# Patient Record
Sex: MALE | Race: WHITE | NOT HISPANIC OR LATINO | Employment: OTHER | URBAN - METROPOLITAN AREA
[De-identification: names, ages, dates, MRNs, and addresses within clinical notes are randomized per-mention and may not be internally consistent; named-entity substitution may affect disease eponyms.]

---

## 2022-05-05 ENCOUNTER — TRANSFERRED RECORDS (OUTPATIENT)
Dept: HEALTH INFORMATION MANAGEMENT | Facility: CLINIC | Age: 62
End: 2022-05-05
Payer: COMMERCIAL

## 2022-05-05 LAB
ALT SERPL-CCNC: 29 U/L (ref 12–78)
AST SERPL-CCNC: 18 U/L (ref 0–47)
CREATININE (EXTERNAL): 1.29 MG/DL (ref 0.7–1.3)
GFR ESTIMATED (EXTERNAL): >60 ML/MIN/1.73M2
GLUCOSE (EXTERNAL): 184 MG/DL (ref 70–99)
INR (EXTERNAL): 1.3 (ref 0.9–1.1)
POTASSIUM (EXTERNAL): 4 MMOL/L (ref 3.5–5.2)

## 2022-05-09 NOTE — TELEPHONE ENCOUNTER
Action 5.9.22 sv    Action Taken Called pt, no  but LVM about gathering records for future appointment      Action 5.12.22 sv    Action Taken Called pt and was able to locate most recent records [facility]- records request sent to-- [phone]     Action 5.13.22 sv    Action Taken Received records from [facility]- sent to Urgent fax, will keep fax in basket incase it doesn't load up in time (uploading in process)                 RECORDS RECEIVED FROM: internal    DATE RECEIVED: 5.16.22    NOTES STATUS DETAILS   OFFICE NOTE from referring provider     Self referred    OFFICE NOTE from other cardiologist    In process     DISCHARGE SUMMARY from hospital    In process     DISCHARGE REPORT from the ER   In process     OPERATIVE REPORT    In process     MEDICATION LIST   In process     LABS     BMP   In process     CBC   In process     CMP   In process     Lipids   In process     TSH   In process     DIAGNOSTIC PROCEDURES     EKG   In process     Monitor Reports   In process     IMAGING (DISC & REPORT)      Echo   In process     Stress Tests   In process     Cath   In process     MRI/MRA   In process     CT/CTA   In process

## 2022-05-16 ENCOUNTER — PRE VISIT (OUTPATIENT)
Dept: CARDIOLOGY | Facility: CLINIC | Age: 62
End: 2022-05-16
Payer: COMMERCIAL

## 2022-05-16 ENCOUNTER — OFFICE VISIT (OUTPATIENT)
Dept: CARDIOLOGY | Facility: CLINIC | Age: 62
End: 2022-05-16
Attending: INTERNAL MEDICINE
Payer: COMMERCIAL

## 2022-05-16 VITALS
HEART RATE: 79 BPM | OXYGEN SATURATION: 96 % | WEIGHT: 205.6 LBS | SYSTOLIC BLOOD PRESSURE: 117 MMHG | HEIGHT: 69 IN | BODY MASS INDEX: 30.45 KG/M2 | DIASTOLIC BLOOD PRESSURE: 79 MMHG

## 2022-05-16 DIAGNOSIS — I47.10 SVT (SUPRAVENTRICULAR TACHYCARDIA) (H): Primary | ICD-10-CM

## 2022-05-16 PROCEDURE — G0463 HOSPITAL OUTPT CLINIC VISIT: HCPCS | Mod: 25

## 2022-05-16 PROCEDURE — 93005 ELECTROCARDIOGRAM TRACING: CPT

## 2022-05-16 PROCEDURE — 99204 OFFICE O/P NEW MOD 45 MIN: CPT | Performed by: INTERNAL MEDICINE

## 2022-05-16 RX ORDER — DILTIAZEM HYDROCHLORIDE 180 MG/1
180 CAPSULE, EXTENDED RELEASE ORAL DAILY
Qty: 90 CAPSULE | Refills: 2 | Status: SHIPPED | OUTPATIENT
Start: 2022-05-16 | End: 2022-08-04

## 2022-05-16 RX ORDER — LISINOPRIL 20 MG/1
20 TABLET ORAL DAILY
COMMUNITY
Start: 2022-02-11 | End: 2022-05-16

## 2022-05-16 ASSESSMENT — PAIN SCALES - GENERAL: PAINLEVEL: NO PAIN (0)

## 2022-05-16 NOTE — NURSING NOTE
Chief Complaint   Patient presents with     New Patient     NEW- self referred, per wife SVT ?  no records as of 5/10/22       Vitals were taken, medications reconciled, and EKG was performed.    Jass Mata EMT  4:09 PM

## 2022-05-16 NOTE — LETTER
"5/16/2022      RE: Josue Franco  [address] [facility] [address]       Dear Colleague,    Thank you for the opportunity to participate in the care of your patient, Josue Franco, at the [facility] [place] at [facility]. Please see a copy of my visit note below.    HPI:   Josue Franco is a 61 yo Male with a PMH of HTN, PAF and SVT.  He referred himself for a cardiology evaluation.  His wife is an internist.  He had an AF episode in 2017 and underwent a DCCV.  He was likely to have had an SVT lasting for 30 min in 11/2021 at the time of hernia repair.  He then had an SVT episode on 5/5/2022 and visited the [facility] ED where Adenosine terminated the SVT.  He denied any chest pain, SOB or dizziness.    PAST MEDICAL HISTORY:  No past medical history on file.    CURRENT MEDICATIONS:  Current Outpatient Medications   Medication Sig Dispense Refill     lisinopril (ZESTRIL) 20 MG tablet Take 20 mg by mouth daily         PAST SURGICAL HISTORY:  No past surgical history on file.    ALLERGIES:   No Known Allergies    FAMILY HISTORY:  + Premature coronary artery disease  - Atrial fibrillation  - Sudden cardiac death     SOCIAL HISTORY:  Social History     Tobacco Use     Smoking status: Never Smoker     Smokeless tobacco: Never Used       ROS:   Constitutional: No fever, chills, or sweats. Weight stable.   ENT: No visual disturbance, ear ache, epistaxis, sore throat.   Cardiovascular: As per HPI.   Respiratory: No cough, hemoptysis.    GI: No nausea, vomiting, hematemesis, melena, or hematochezia.   : No hematuria.   Integument: Negative.   Psychiatric: Negative.   Hematologic:  Easy bruising, no easy bleeding.  Neuro: Negative.   Endocrinology: No significant heat or cold intolerance   Musculoskeletal: No myalgia.    Exam:  /79 (BP Location: Right arm, Patient Position: Chair, Cuff Size: Adult Large)   Pulse 79   Ht 1.744 m (5' 8.66\")   Wt " "93.3 kg (205 lb 9.6 oz)   SpO2 96%   BMI 30.66 kg/m    GENERAL APPEARANCE: healthy, alert and no distress  HEENT: no icterus, no xanthelasmas, normal pupil size and reaction, normal palate, mucosa moist, no central cyanosis  NECK: no adenopathy, no asymmetry, masses, or scars, thyroid normal to palpation and no bruits, JVP not elevated  RESPIRATORY: lungs clear to auscultation - no rales, rhonchi or wheezes, no use of accessory muscles, no retractions, respirations are unlabored, normal respiratory rate  CARDIOVASCULAR: regular rhythm, normal S1 with physiologic split S2, no S3 or S4 and no murmur, click or rub, precordium quiet with normal PMI.  ABDOMEN: soft, non tender, without hepatosplenomegaly, no masses palpable, bowel sounds normal, aorta not enlarged by palpation, no abdominal bruits  EXTREMITIES: peripheral pulses normal, no edema, no bruits  NEURO: alert and oriented to person/place/time, normal speech, gait and affect  VASC: Radial, femoral, dorsalis pedis and posterior tibialis pulses are normal in volumes and symmetric bilaterally. No bruits are heard.  SKIN: no ecchymoses, no rashes    Labs:  CBC RESULTS:   No results found for: WBC, RBC, HGB, HCT, MCV, MCH, MCHC, RDW, PLT    BMP RESULTS:  No results found for: NA, POTASSIUM, CHLORIDE, CO2, ANIONGAP, GLC, BUN, CR, GFRESTIMATED, GFRESTBLACK, ABELARDO     INR RESULTS:  No results found for: INR    Procedures:  ECG on 5/5/2022: Reviewed.          Assessment and Plan:   # HTN  # PAF  He had an AF episode in 2017 and underwent a DCCV.  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  He was likely to have had an SVT lasting for 30 min in 11/2021 at the time of hernia repair.  He then had an SVT episode on 5/5/2022 and visited the [facility]SH ED where Adenosine terminated the SVT.  We discussed treatment options including \"Pill in Pocket\" approach, switch Lisinopril to CCB or BB, and catheter ablation. Catheter ablation of SVT may also treat AF.  The nature, risks, benefits, alternatives and " anticipated results of the procedure were explained to the patient. These risks include but are not limited to local vascular damage, bleeding, pulmonary vein stenosis, AV block requiring a pacemaker implantation, tamponade and stroke. The patient wished to reflect on this and call us as he decides to proceed with the procedure.  He decided to switch Lisinopril to Diltiazem 180 mg daily and see how it works.  I will see him back in 6 months.    I spent a total of 45 min today to review the records, see the patient, and complete the documents.      Please do not hesitate to contact me if you have any questions/concerns.     Sincerely,     Shona Tai MD      CC  Patient Care Team:  Vaughn Borden MD as PCP - General

## 2022-05-16 NOTE — PATIENT INSTRUCTIONS
You were seen in the Electrophysiology Clinic today by: Dr Tai    Plan:   Medication Changes:   STOP- Lisinopril  START- Diltiazem 180mg ER once daily    Follow up visit:  6 months with Dr Tai          Your Care Team:  [facility] Cardiology   Telephone Number     Nurse Line  Ana Laura Jacobs RN  [phone]     For scheduling appts or procedures:    Jeanne Colon   [phone]   For the Device Clinic (Pacemakers, ICDs, Loop Recorders)    During business hours: [phone]  After business hours:   [phone]- select option 4 and ask for job code [id number].     On-call cardiologist for after hours or on weekends: [phone], option #4, and ask to speak to the on-call cardiologist.     Cardiovascular Clinic:   [address] [place] [address]. [place] [address]      As always, Thank you for trusting us with your health care needs!

## 2022-05-16 NOTE — PROGRESS NOTES
"HPI:   Josue Franco is a 63 yo Male with a PMH of HTN, PAF and SVT.  He referred himself for a cardiology evaluation.  His wife is an internist.  He had an AF episode in 2017 and underwent a DCCV.  He was likely to have had an SVT lasting for 30 min in 11/2021 at the time of hernia repair.  He then had an SVT episode on 5/5/2022 and [facility] the [facility] where Adenosine terminated the SVT.  He denied any chest pain, SOB or dizziness.    PAST MEDICAL HISTORY:  No past medical history on file.    CURRENT MEDICATIONS:  Current Outpatient Medications   Medication Sig Dispense Refill     lisinopril (ZESTRIL) 20 MG tablet Take 20 mg by mouth daily         PAST SURGICAL HISTORY:  No past surgical history on file.    ALLERGIES:   No Known Allergies    FAMILY HISTORY:  + Premature coronary artery disease  - Atrial fibrillation  - Sudden cardiac death     SOCIAL HISTORY:  Social History     Tobacco Use     Smoking status: Never Smoker     Smokeless tobacco: Never Used       ROS:   Constitutional: No fever, chills, or sweats. Weight stable.   ENT: No visual disturbance, ear ache, epistaxis, sore throat.   Cardiovascular: As per HPI.   Respiratory: No cough, hemoptysis.    GI: No nausea, vomiting, hematemesis, melena, or hematochezia.   : No hematuria.   Integument: Negative.   Psychiatric: Negative.   Hematologic:  Easy bruising, no easy bleeding.  Neuro: Negative.   Endocrinology: No significant heat or cold intolerance   Musculoskeletal: No myalgia.    Exam:  /79 (BP Location: Right arm, Patient Position: Chair, Cuff Size: Adult Large)   Pulse 79   Ht 1.744 m (5' 8.66\")   Wt 93.3 kg (205 lb 9.6 oz)   SpO2 96%   BMI 30.66 kg/m    GENERAL APPEARANCE: healthy, alert and no distress  HEENT: no icterus, no xanthelasmas, normal pupil size and reaction, normal palate, mucosa moist, no central cyanosis  NECK: no adenopathy, no asymmetry, masses, or scars, thyroid normal to palpation and no bruits, JVP not " "elevated  RESPIRATORY: lungs clear to auscultation - no rales, rhonchi or wheezes, no use of accessory muscles, no retractions, respirations are unlabored, normal respiratory rate  CARDIOVASCULAR: regular rhythm, normal S1 with physiologic split S2, no S3 or S4 and no murmur, click or rub, precordium quiet with normal PMI.  ABDOMEN: soft, non tender, without hepatosplenomegaly, no masses palpable, bowel sounds normal, aorta not enlarged by palpation, no abdominal bruits  EXTREMITIES: peripheral pulses normal, no edema, no bruits  NEURO: alert and oriented to person/place/time, normal speech, gait and affect  VASC: Radial, femoral, dorsalis pedis and posterior tibialis pulses are normal in volumes and symmetric bilaterally. No bruits are heard.  SKIN: no ecchymoses, no rashes    Labs:  CBC RESULTS:   No results found for: WBC, RBC, HGB, HCT, MCV, MCH, MCHC, RDW, PLT    BMP RESULTS:  No results found for: NA, POTASSIUM, CHLORIDE, CO2, ANIONGAP, GLC, BUN, CR, GFRESTIMATED, GFRESTBLACK, ABELARDO     INR RESULTS:  No results found for: INR    Procedures:  ECG on 5/5/2022: Reviewed.          Assessment and Plan:   # HTN  # PAF  He had an AF episode in 2017 and underwent a DCCV.  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  He was likely to have had an SVT lasting for 30 min in 11/2021 at the time of hernia repair.  He then had an SVT episode on 5/5/2022 and [facility] where Adenosine terminated the SVT.  We discussed treatment options including \"Pill in Pocket\" approach, switch Lisinopril to CCB or BB, and catheter ablation. Catheter ablation of SVT may also treat AF.  The nature, risks, benefits, alternatives and anticipated results of the procedure were explained to the patient. These risks include but are not limited to local vascular damage, bleeding, pulmonary vein stenosis, AV block requiring a pacemaker implantation, tamponade and stroke. The patient wished to reflect on this and call us as he decides to proceed with the " procedure.  He decided to switch Lisinopril to Diltiazem 180 mg daily and see how it works.  I will see him back in 6 months.    I spent a total of 45 min today to review the records, see the patient, and complete the documents.    CC  Patient Care Team:  Vaughn Borden MD as PCP - General  Shona Tai MD (Cardiovascular Disease)  SELF, REFERRED

## 2022-05-17 LAB
ATRIAL RATE - MUSE: 75 BPM
DIASTOLIC BLOOD PRESSURE - MUSE: NORMAL MMHG
INTERPRETATION ECG - MUSE: NORMAL
P AXIS - MUSE: 28 DEGREES
PR INTERVAL - MUSE: 142 MS
QRS DURATION - MUSE: 88 MS
QT - MUSE: 378 MS
QTC - MUSE: 422 MS
R AXIS - MUSE: 13 DEGREES
SYSTOLIC BLOOD PRESSURE - MUSE: NORMAL MMHG
T AXIS - MUSE: 52 DEGREES
VENTRICULAR RATE- MUSE: 75 BPM

## 2022-08-04 DIAGNOSIS — I47.10 SVT (SUPRAVENTRICULAR TACHYCARDIA) (H): ICD-10-CM

## 2022-08-04 RX ORDER — DILTIAZEM HYDROCHLORIDE 180 MG/1
180 CAPSULE, EXTENDED RELEASE ORAL DAILY
Qty: 30 CAPSULE | Refills: 0 | Status: SHIPPED | OUTPATIENT
Start: 2022-08-04 | End: 2022-11-21

## 2022-08-04 NOTE — TELEPHONE ENCOUNTER
[facility] Call Center    Phone Message    May a detailed message be left on voicemail: no     Reason for Call: Medication Question or concern regarding medication   Prescription Clarification  Name of Medication: diltiazem ER (TIAZAC) 180 MG 24 hr ER beaded capsule  Prescribing Provider: Shona Tai MD    Pharmacy:   [facility]  [address] [place] [address]  Phone# [phone]   What on the order needs clarification?   Refill request. Pt is requesting a 30 day supply to get him through until he gets back to [place]. Please call Pt to advise if this can be approved.           Action Taken: Message routed to:  [facility] ([facility] Cardiology    Travel Screening: Not Applicable

## 2022-08-04 NOTE — TELEPHONE ENCOUNTER
Last Clinic Visit: 5/16/2022  [facility]  [place] 11/21/22  Creatinine care everywhere 5/5/22  Creatinine (External) 0.70 - 1.30 mg/dL 1.29      30 day refill to requested pharmacy  Call to norbert Salas 30 day refill sent to requested pharmacy

## 2022-11-21 ENCOUNTER — OFFICE VISIT (OUTPATIENT)
Dept: CARDIOLOGY | Facility: CLINIC | Age: 62
End: 2022-11-21
Attending: INTERNAL MEDICINE
Payer: COMMERCIAL

## 2022-11-21 VITALS
BODY MASS INDEX: 30.81 KG/M2 | WEIGHT: 208 LBS | SYSTOLIC BLOOD PRESSURE: 164 MMHG | HEIGHT: 69 IN | OXYGEN SATURATION: 97 % | HEART RATE: 68 BPM | DIASTOLIC BLOOD PRESSURE: 85 MMHG

## 2022-11-21 DIAGNOSIS — I47.10 SVT (SUPRAVENTRICULAR TACHYCARDIA) (H): Primary | ICD-10-CM

## 2022-11-21 PROCEDURE — G0463 HOSPITAL OUTPT CLINIC VISIT: HCPCS | Mod: 25

## 2022-11-21 PROCEDURE — 99214 OFFICE O/P EST MOD 30 MIN: CPT | Performed by: INTERNAL MEDICINE

## 2022-11-21 PROCEDURE — 93005 ELECTROCARDIOGRAM TRACING: CPT

## 2022-11-21 RX ORDER — DILTIAZEM HYDROCHLORIDE 180 MG/1
180 CAPSULE, EXTENDED RELEASE ORAL DAILY
Qty: 90 CAPSULE | Refills: 3 | Status: SHIPPED | OUTPATIENT
Start: 2022-11-21 | End: 2023-12-05

## 2022-11-21 ASSESSMENT — PAIN SCALES - GENERAL: PAINLEVEL: NO PAIN (0)

## 2022-11-21 NOTE — PROGRESS NOTES
"HPI:   Josue Franco is a 61 yo Male with a PMH of HTN, PAF and SVT.  He referred himself for a cardiology evaluation.  His wife is an internist.  He had an AF episode in 2017 and underwent a DCCV.  He was likely to have had an SVT lasting for 30 min in 11/2021 at the time of hernia repair.  He then had an SVT episode on 5/5/2022 and visited the [facility] ED where Adenosine terminated the SVT.  He denied any chest pain, SOB or dizziness.    He switched Lisinopril to Diltiazem and since then he had done well when he got COVID infection and SVT recurred.  He had to visit the local ED where intravenous Adenosine terminated the SVT.      PAST MEDICAL HISTORY:  No past medical history on file.    CURRENT MEDICATIONS:  Current Outpatient Medications   Medication Sig Dispense Refill     diltiazem ER (TIAZAC) 180 MG 24 hr ER beaded capsule Take 1 capsule (180 mg) by mouth daily 30 capsule 0       PAST SURGICAL HISTORY:  No past surgical history on file.    ALLERGIES:   No Known Allergies    FAMILY HISTORY:  + Premature coronary artery disease  - Atrial fibrillation  - Sudden cardiac death     SOCIAL HISTORY:  Social History     Tobacco Use     Smoking status: Never     Smokeless tobacco: Never       ROS:   Constitutional: No fever, chills, or sweats. Weight stable.   ENT: No visual disturbance, ear ache, epistaxis, sore throat.   Cardiovascular: As per HPI.   Respiratory: No cough, hemoptysis.    GI: No nausea, vomiting, hematemesis, melena, or hematochezia.   : No hematuria.   Integument: Negative.   Psychiatric: Negative.   Hematologic:  Easy bruising, no easy bleeding.  Neuro: Negative.   Endocrinology: No significant heat or cold intolerance   Musculoskeletal: No myalgia.    Exam:  BP (!) 164/85 (BP Location: Right arm, Patient Position: Chair, Cuff Size: Adult Regular)   Pulse 68   Ht 1.755 m (5' 9.09\")   Wt 94.3 kg (208 lb)   SpO2 97%   BMI 30.63 kg/m    GENERAL APPEARANCE: healthy, alert and no distress  HEENT: " "no icterus, no xanthelasmas, normal pupil size and reaction, normal palate, mucosa moist, no central cyanosis  NECK: no adenopathy, no asymmetry, masses, or scars, thyroid normal to palpation and no bruits, JVP not elevated  RESPIRATORY: lungs clear to auscultation - no rales, rhonchi or wheezes, no use of accessory muscles, no retractions, respirations are unlabored, normal respiratory rate  CARDIOVASCULAR: regular rhythm, normal S1 with physiologic split S2, no S3 or S4 and no murmur, click or rub, precordium quiet with normal PMI.  ABDOMEN: soft, non tender, without hepatosplenomegaly, no masses palpable, bowel sounds normal, aorta not enlarged by palpation, no abdominal bruits  EXTREMITIES: peripheral pulses normal, no edema, no bruits  NEURO: alert and oriented to person/place/time, normal speech, gait and affect  VASC: Radial, femoral, dorsalis pedis and posterior tibialis pulses are normal in volumes and symmetric bilaterally. No bruits are heard.  SKIN: no ecchymoses, no rashes    Labs:  CBC RESULTS:   No results found for: WBC, RBC, HGB, HCT, MCV, MCH, MCHC, RDW, PLT    BMP RESULTS:  No results found for: NA, POTASSIUM, CHLORIDE, CO2, ANIONGAP, GLC, BUN, CR, GFRESTIMATED, GFRESTBLACK, ABELARDO     INR RESULTS:  Lab Results   Component Value Date    INR 1.3 (A) 05/05/2022       Procedures:  ECG on 5/5/2022: Reviewed.        ECG on 11/21/2022: Reviewed.        Assessment and Plan:   # HTN  # PAF  He had an AF episode in 2017 and underwent a DCCV.  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  He was likely to have had an SVT lasting for 30 min in 11/2021 at the time of hernia repair.  He then had an SVT episode on 5/5/2022 and visited the [facility] ED where Adenosine terminated the SVT.  We discussed treatment options including \"Pill in Pocket\" approach, switch Lisinopril to CCB or BB, and catheter ablation. Catheter ablation of SVT may also treat AF.  He switched Lisinopril to Diltiazem and since then he had done well when he got COVID " infection and SVT recurred.  He had to visit the local ED where intravenous Adenosine terminated the SVT.  We discussed treatment options again.  The nature, risks, benefits, alternatives and anticipated results of the procedure were explained to the patient. These risks include but are not limited to local vascular damage, bleeding, pulmonary vein stenosis, AV block requiring a pacemaker implantation, tamponade and stroke. The patient wished to reflect on this and call us as he decides to proceed with the procedure.    Otherwise, I will see him back in 6 months.    I spent a total of 30 min today to review the records, see the patient, and complete the documents.    CC  Patient Care Team:  Vaughn Borden MD as PCP - General  Shona Tai MD (Cardiovascular Disease)  Shona Tai MD as Assigned Heart and Vascular Provider  SELF, REFERRED

## 2022-11-21 NOTE — LETTER
11/21/2022      RE: Josue Franco  [address] [address]       Dear Colleague,    Thank you for the opportunity to participate in the care of your patient, Josue Franco, at the [facility]. Please see a copy of my visit note below.    HPI:   Josue Franco is a 61 yo Male with a PMH of HTN, PAF and SVT.  He referred himself for a cardiology evaluation.  His wife is an internist.  He had an AF episode in 2017 and underwent a DCCV.  He was likely to have had an SVT lasting for 30 min in 11/2021 at the time of hernia repair.  He then had an SVT episode on 5/5/2022 and visited the [facility] ED where Adenosine terminated the SVT.  He denied any chest pain, SOB or dizziness.    He switched Lisinopril to Diltiazem and since then he had done well when he got COVID infection and SVT recurred.  He had to visit the local ED where intravenous Adenosine terminated the SVT.      PAST MEDICAL HISTORY:  No past medical history on file.    CURRENT MEDICATIONS:  Current Outpatient Medications   Medication Sig Dispense Refill     diltiazem ER (TIAZAC) 180 MG 24 hr ER beaded capsule Take 1 capsule (180 mg) by mouth daily 30 capsule 0       PAST SURGICAL HISTORY:  No past surgical history on file.    ALLERGIES:   No Known Allergies    FAMILY HISTORY:  + Premature coronary artery disease  - Atrial fibrillation  - Sudden cardiac death     SOCIAL HISTORY:  Social History     Tobacco Use     Smoking status: Never     Smokeless tobacco: Never       ROS:   Constitutional: No fever, chills, or sweats. Weight stable.   ENT: No visual disturbance, ear ache, epistaxis, sore throat.   Cardiovascular: As per HPI.   Respiratory: No cough, hemoptysis.    GI: No nausea, vomiting, hematemesis, melena, or hematochezia.   : No hematuria.   Integument: Negative.   Psychiatric: Negative.   Hematologic:  Easy bruising, no easy bleeding.  Neuro: Negative.  "  Endocrinology: No significant heat or cold intolerance   Musculoskeletal: No myalgia.    Exam:  BP (!) 164/85 (BP Location: Right arm, Patient Position: Chair, Cuff Size: Adult Regular)   Pulse 68   Ht 1.755 m (5' 9.09\")   Wt 94.3 kg (208 lb)   SpO2 97%   BMI 30.63 kg/m    GENERAL APPEARANCE: healthy, alert and no distress  HEENT: no icterus, no xanthelasmas, normal pupil size and reaction, normal palate, mucosa moist, no central cyanosis  NECK: no adenopathy, no asymmetry, masses, or scars, thyroid normal to palpation and no bruits, JVP not elevated  RESPIRATORY: lungs clear to auscultation - no rales, rhonchi or wheezes, no use of accessory muscles, no retractions, respirations are unlabored, normal respiratory rate  CARDIOVASCULAR: regular rhythm, normal S1 with physiologic split S2, no S3 or S4 and no murmur, click or rub, precordium quiet with normal PMI.  ABDOMEN: soft, non tender, without hepatosplenomegaly, no masses palpable, bowel sounds normal, aorta not enlarged by palpation, no abdominal bruits  EXTREMITIES: peripheral pulses normal, no edema, no bruits  NEURO: alert and oriented to person/place/time, normal speech, gait and affect  VASC: Radial, femoral, dorsalis pedis and posterior tibialis pulses are normal in volumes and symmetric bilaterally. No bruits are heard.  SKIN: no ecchymoses, no rashes    Labs:  CBC RESULTS:   No results found for: WBC, RBC, HGB, HCT, MCV, MCH, MCHC, RDW, PLT    BMP RESULTS:  No results found for: NA, POTASSIUM, CHLORIDE, CO2, ANIONGAP, GLC, BUN, CR, GFRESTIMATED, GFRESTBLACK, ABELARDO     INR RESULTS:  Lab Results   Component Value Date    INR 1.3 (A) 05/05/2022       Procedures:  ECG on 5/5/2022: Reviewed.        ECG on 11/21/2022: Reviewed.        Assessment and Plan:   # HTN  # PAF  He had an AF episode in 2017 and underwent a DCCV.  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  He was likely to have had an SVT lasting for 30 min in 11/2021 at the time of hernia repair.  He then had an SVT " "episode on 5/5/2022 and visited the [facility] ED where Adenosine terminated the SVT.  We discussed treatment options including \"Pill in Pocket\" approach, switch Lisinopril to CCB or BB, and catheter ablation. Catheter ablation of SVT may also treat AF.  He switched Lisinopril to Diltiazem and since then he had done well when he got COVID infection and SVT recurred.  He had to visit the local ED where intravenous Adenosine terminated the SVT.  We discussed treatment options again.  The nature, risks, benefits, alternatives and anticipated results of the procedure were explained to the patient. These risks include but are not limited to local vascular damage, bleeding, pulmonary vein stenosis, AV block requiring a pacemaker implantation, tamponade and stroke. The patient wished to reflect on this and call us as he decides to proceed with the procedure.    Otherwise, I will see him back in 6 months.    I spent a total of 30 min today to review the records, see the patient, and complete the documents.    CC  Patient Care Team:  Vaughn Borden MD as PCP - General      Please do not hesitate to contact me if you have any questions/concerns.     Sincerely,     Shona Tai MD    "

## 2022-11-21 NOTE — NURSING NOTE
Chief Complaint   Patient presents with     Follow Up     6 mo f/u for svt       Vitals were taken, medications reconciled, and EKG performed.    Jameel Laboy  1:39 PM

## 2022-11-21 NOTE — PATIENT INSTRUCTIONS
You were seen in the Electrophysiology Clinic today by: Dr Tai    Plan:     Follow up Visit:  6 months    Further Instructions:  Let us know if you would like to proceed with SVT Ablation    You are scheduled for an SVT ablation, at [facility] with Dr. Tai. The hospital is located at [address] [place] [address] on [place] the [place].  If you need to cancel this procedure, please call [phone].    You will need to have a covid swab done prior to procedure.          - At-home, rapid antigen test:              - Perform within 1-2 days of procedure              - If negative, take a photo of the result or report the test result on the day of procedure               - If positive, contact Jeanne Colon at [phone]              - Where to find: For purchase at pharmacies, or you can order free tests at covid.gov/tests           - PCR (if you can't find or do a rapid antigen test, OR if you are staying overnight):              - Perform within 4 days of procedure                             - If being performed at an outside lab, result needs to be faxed to [phone].     Visitor Policy: Two visitors.    Supraventricular Tachycardia Ablation (SVT)    Date:______  Time: _______________To the Gold Waiting Room at the [facility]    1. Your history and physical will be completed by our nurse practitioner when you arrive.  2. Please do not eat anything for 8 hours prior to your procedure. You may have sips of water up until 2 hours prior to your arrival.  3. If you are diabetic follow the following instructions: contact your diabetes team if you have questions  4. The morning of your procedure, you may take your scheduled medications with a sip of water   Hold: Diltiazem 5 days prior to procedure  5. You will discharge the same day and need a .      Post-Procedure Instructions  Care of groin site:   Remove the Band-Aid after 24 hours. If there is minor  oozing, apply another Band-aid and remove it after 12 hours.    Do NOT take a bath, use a hot tub, pool, or submerse in water for at least 3 days. You may shower.    It is normal to have a small bruise or lump at the site.   Do not scrub the site.   Do not use lotion or powder near the puncture site for 3 days.    If you start bleeding from the site in your groin: Lie down flat and press firmly on the site. Call your physician immediately, or, come to the emergency room.  Call 911 right away if you have bleeding that is heavy or does not stop.    Call your doctor/provider if:    You have a large or growing hard lump around the site.    The site is red, swollen, hot or tender.    You have chills or a fever greater than 101 F (38 C).    Blood or fluid is draining from the site.    Your leg or arm turns bluish, feels numb or cool.    You have hives, a rash or unusual itching.    Activity Restrictions   For the first 2 days: Do not stoop or squat. When you cough, sneeze or move your bowels, hold your hand over the puncture site and press gently.   Do not lift more than 10 pounds or exertional activity for 10 days.  - No driving for 24 hours after (with or without general anesthesia).       Date: _______ Follow up with Dr. Tai.                Your Care Team:    EP Cardiology   Telephone Number     Nurse Line  Ana Laura Jacobs, RN   Crystal Dubois RN   [phone]     For scheduling appointments:   Sandhya   [phone]   For procedure scheduling:    Jeanne Colon     [phone]   For the Device Clinic (Pacemakers, ICDs, Loop Recorders)    During business hours: [phone]  After business hours:   [phone]- select option 4 and ask for job code [id number].       On-call cardiologist for after hours or on weekends:   [phone], option #4, and ask to speak to the on-call cardiologist.     Cardiovascular Clinic:   [address] [place] [address]. [place] [address]      As always, Thank you for trusting us with your  health care needs!

## 2022-11-22 LAB
ATRIAL RATE - MUSE: 58 BPM
DIASTOLIC BLOOD PRESSURE - MUSE: NORMAL MMHG
INTERPRETATION ECG - MUSE: NORMAL
P AXIS - MUSE: 31 DEGREES
PR INTERVAL - MUSE: 158 MS
QRS DURATION - MUSE: 106 MS
QT - MUSE: 428 MS
QTC - MUSE: 420 MS
R AXIS - MUSE: 26 DEGREES
SYSTOLIC BLOOD PRESSURE - MUSE: NORMAL MMHG
T AXIS - MUSE: 51 DEGREES
VENTRICULAR RATE- MUSE: 58 BPM

## 2023-02-12 ENCOUNTER — HEALTH MAINTENANCE LETTER (OUTPATIENT)
Age: 63
End: 2023-02-12

## 2023-03-11 DIAGNOSIS — I47.10 SVT (SUPRAVENTRICULAR TACHYCARDIA) (H): ICD-10-CM

## 2023-03-14 RX ORDER — DILTIAZEM HYDROCHLORIDE 180 MG/1
180 CAPSULE, EXTENDED RELEASE ORAL DAILY
Qty: 90 CAPSULE | Refills: 3 | OUTPATIENT
Start: 2023-03-14

## 2023-03-22 DIAGNOSIS — I47.10 SVT (SUPRAVENTRICULAR TACHYCARDIA) (H): ICD-10-CM

## 2023-03-24 RX ORDER — DILTIAZEM HYDROCHLORIDE 180 MG/1
180 CAPSULE, EXTENDED RELEASE ORAL DAILY
Qty: 90 CAPSULE | Refills: 3 | OUTPATIENT
Start: 2023-03-24

## 2023-03-24 NOTE — TELEPHONE ENCOUNTER
Dup last rx; 11-21-22 for 90c w/3rf @ [facility],  [id number] - [place], [place] - [address] [place] [address]  [phone]  -[facility] w/  [facility]

## 2023-05-15 ENCOUNTER — OFFICE VISIT (OUTPATIENT)
Dept: CARDIOLOGY | Facility: CLINIC | Age: 63
End: 2023-05-15
Attending: INTERNAL MEDICINE
Payer: COMMERCIAL

## 2023-05-15 VITALS
BODY MASS INDEX: 30.57 KG/M2 | DIASTOLIC BLOOD PRESSURE: 94 MMHG | WEIGHT: 206.4 LBS | SYSTOLIC BLOOD PRESSURE: 152 MMHG | OXYGEN SATURATION: 96 % | HEART RATE: 77 BPM | HEIGHT: 69 IN

## 2023-05-15 DIAGNOSIS — I47.10 SVT (SUPRAVENTRICULAR TACHYCARDIA) (H): ICD-10-CM

## 2023-05-15 PROCEDURE — 93005 ELECTROCARDIOGRAM TRACING: CPT

## 2023-05-15 PROCEDURE — 99214 OFFICE O/P EST MOD 30 MIN: CPT | Performed by: INTERNAL MEDICINE

## 2023-05-15 PROCEDURE — 99213 OFFICE O/P EST LOW 20 MIN: CPT | Mod: 25 | Performed by: INTERNAL MEDICINE

## 2023-05-15 RX ORDER — METOPROLOL TARTRATE 50 MG
50 TABLET ORAL 2 TIMES DAILY
Qty: 10 TABLET | Refills: 0 | Status: SHIPPED | OUTPATIENT
Start: 2023-05-15

## 2023-05-15 ASSESSMENT — PAIN SCALES - GENERAL: PAINLEVEL: NO PAIN (0)

## 2023-05-15 NOTE — LETTER
5/15/2023      RE: Josue Franco  [address] [facility] [address]       Dear Colleague,    Thank you for the opportunity to participate in the care of your patient, Josue Franco, at the [facility]. Please see a copy of my visit note below.    HPI:   Josue Franco is a 62 yo Male with a PMH of HTN, PAF and SVT.  He referred himself for a cardiology evaluation.  His wife is an internist.  He had an AF episode in 2017 and underwent a DCCV.  He was likely to have had an SVT lasting for 30 min in 11/2021 at the time of hernia repair.  He then had an SVT episode on 5/5/2022 and visited the [facility] ED where Adenosine terminated the SVT.  He denied any chest pain, SOB or dizziness.    He switched Lisinopril to Diltiazem and since then he had done well when he got COVID infection and SVT recurred.  He had to visit the local ED where intravenous Adenosine terminated the SVT.    He is now seen for a follow up.  He has had no SVTs on Diltiazem since the last visit.    PAST MEDICAL HISTORY:  No past medical history on file.    CURRENT MEDICATIONS:  Current Outpatient Medications   Medication Sig Dispense Refill    diltiazem ER (TIAZAC) 180 MG 24 hr ER beaded capsule Take 1 capsule (180 mg) by mouth daily 90 capsule 3       PAST SURGICAL HISTORY:  No past surgical history on file.    ALLERGIES:   No Known Allergies    FAMILY HISTORY:  + Premature coronary artery disease  - Atrial fibrillation  - Sudden cardiac death     SOCIAL HISTORY:  Social History     Tobacco Use    Smoking status: Never    Smokeless tobacco: Never       ROS:   Constitutional: No fever, chills, or sweats. Weight stable.   ENT: No visual disturbance, ear ache, epistaxis, sore throat.   Cardiovascular: As per HPI.   Respiratory: No cough, hemoptysis.    GI: No nausea, vomiting, hematemesis, melena, or hematochezia.   : No hematuria.   Integument: Negative.  "  Psychiatric: Negative.   Hematologic:  Easy bruising, no easy bleeding.  Neuro: Negative.   Endocrinology: No significant heat or cold intolerance   Musculoskeletal: No myalgia.    Exam:  BP (!) 170/84 (BP Location: Left arm, Patient Position: Sitting, Cuff Size: Adult Large)   Pulse 77   Ht 1.755 m (5' 9.09\")   Wt 93.6 kg (206 lb 6.4 oz)   SpO2 96%   BMI 30.40 kg/m    GENERAL APPEARANCE: healthy, alert and no distress  HEENT: no icterus, no xanthelasmas, normal pupil size and reaction, normal palate, mucosa moist, no central cyanosis  NECK: no adenopathy, no asymmetry, masses, or scars, thyroid normal to palpation and no bruits, JVP not elevated  RESPIRATORY: lungs clear to auscultation - no rales, rhonchi or wheezes, no use of accessory muscles, no retractions, respirations are unlabored, normal respiratory rate  CARDIOVASCULAR: regular rhythm, normal S1 with physiologic split S2, no S3 or S4 and no murmur, click or rub, precordium quiet with normal PMI.  ABDOMEN: soft, non tender, without hepatosplenomegaly, no masses palpable, bowel sounds normal, aorta not enlarged by palpation, no abdominal bruits  EXTREMITIES: peripheral pulses normal, no edema, no bruits  NEURO: alert and oriented to person/place/time, normal speech, gait and affect  VASC: Radial, femoral, dorsalis pedis and posterior tibialis pulses are normal in volumes and symmetric bilaterally. No bruits are heard.  SKIN: no ecchymoses, no rashes    Labs:  CBC RESULTS:   No results found for: WBC, RBC, HGB, HCT, MCV, MCH, MCHC, RDW, PLT    BMP RESULTS:  No results found for: NA, POTASSIUM, CHLORIDE, CO2, ANIONGAP, GLC, BUN, CR, GFRESTIMATED, GFRESTBLACK, ABELARDO     INR RESULTS:  Lab Results   Component Value Date    INR 1.3 (A) 05/05/2022       Procedures:  ECG on 5/5/2022: Reviewed.        ECG on 11/21/2022: Reviewed.      ECG on 5/15/2023: Reviewed.  WNL.      Assessment and Plan:   # HTN  # ED  It might be a side effect of Diltiazem.  # PAF  He " "had an AF episode in 2017 and underwent a DCCV.  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  He was likely to have had an SVT lasting for 30 min in 11/2021 at the time of hernia repair.  He then had an SVT episode on 5/5/2022 and visited the [facility] ED where Adenosine terminated the SVT.  We discussed treatment options including \"Pill in Pocket\" approach, switch Lisinopril to CCB or BB, and catheter ablation. Catheter ablation of SVT may also treat AF.  He switched Lisinopril to Diltiazem and since then he had done well when he got COVID infection and SVT recurred.  He had to visit the local ED where intravenous Adenosine terminated the SVT.  We discussed treatment options again.  The nature, risks, benefits, alternatives and anticipated results of the procedure were explained to the patient. These risks include but are not limited to local vascular damage, bleeding, pulmonary vein stenosis, AV block requiring a pacemaker implantation, tamponade and stroke. The patient wished to reflect on this and call us as he decides to proceed with the procedure. He has no insurance and has to wait until he gets Medicare.  He has had no SVTs on Diltiazem since the last visit.  He will travel to [place] and would like to have some prn meds.  I advised him to take Metoprolol 50 mg as needed.    I will see him back in a year.    I spent a total of 30 min today to review the records, see the patient, and complete the documents.    CC  Patient Care Team:  Vaughn Borden MD as PCP - General  Shona Tai MD (Cardiovascular Disease)  Shona Tai MD as Assigned Heart and Vascular Provider  SELF, REFERRED    "

## 2023-05-15 NOTE — NURSING NOTE
Chief Complaint   Patient presents with     Follow Up     Dr. Tai: return for 6 month follow up d/t SVT       Vitals were taken, medications reconciled and EKG performed.    Mylene Starr, EMT   3:30 PM

## 2023-05-15 NOTE — PATIENT INSTRUCTIONS
You were seen in the Electrophysiology Clinic today by: Dr. Tai    Plan:   Medication Changes: Take Metoprolol 50 mg as needed for SVT episode    Follow up Visit: with Dr. Tai in 1 year       If you have further questions, please utilize [email or web address] to contact us.     Your Care Team:    EP Cardiology   Telephone Number     Nurse Line  Jevon Dior RN    [phone]     For scheduling appointments:   Sandhya   [phone]   For procedure scheduling:    Jeanne Colon     [phone]   For the Device Clinic (Pacemakers, ICDs, Loop Recorders)    During business hours: [phone]  After business hours:   [phone]- select option 4 and ask for job code [id number].       On-call cardiologist for after hours or on weekends:   [phone], option #4, and ask to speak to the on-call cardiologist.     Cardiovascular Clinic:   [address] [place] [address]. [place] [address]      As always, Thank you for trusting us with your health care needs!

## 2023-12-05 ENCOUNTER — TELEPHONE (OUTPATIENT)
Dept: CARDIOLOGY | Facility: CLINIC | Age: 63
End: 2023-12-05
Payer: COMMERCIAL

## 2023-12-05 DIAGNOSIS — I47.10 SVT (SUPRAVENTRICULAR TACHYCARDIA) (H): ICD-10-CM

## 2023-12-05 RX ORDER — DILTIAZEM HYDROCHLORIDE 180 MG/1
180 CAPSULE, EXTENDED RELEASE ORAL DAILY
Qty: 90 CAPSULE | Refills: 1 | Status: SHIPPED | OUTPATIENT
Start: 2023-12-05

## 2023-12-05 NOTE — TELEPHONE ENCOUNTER
[facility] Call Center    Phone Message    May a detailed message be left on voicemail: yes     Reason for Call: Medication Refill Request    Has the patient contacted the pharmacy for the refill? Yes   Name of medication being requested: diltiazem ER (TIAZAC) 180 MG 24 hr ER beaded capsule   Provider who prescribed the medication: Dr. Tai  Pharmacy:  [facility] [address] [place] [address] [address] [facility]      Date medication is needed: 12/11       Action Taken: Other: Cardiology    Travel Screening: Not Applicable    Thank you!  [facility]

## 2023-12-07 ENCOUNTER — TELEPHONE (OUTPATIENT)
Dept: CARDIOLOGY | Facility: CLINIC | Age: 63
End: 2023-12-07
Payer: COMMERCIAL

## 2024-03-04 ENCOUNTER — TELEPHONE (OUTPATIENT)
Dept: CARDIOLOGY | Facility: CLINIC | Age: 64
End: 2024-03-04
Payer: COMMERCIAL

## 2024-03-04 NOTE — TELEPHONE ENCOUNTER
Patient Contacted for the patient to call back and schedule the following:    Appointment type: clifford sharma  Provider: sarah pedraza  Return date: 07/10/24  Specialty phone number: [phone] opt 1  Additional appointment(s) needed: n/a   Additonal Notes: n/a

## 2024-03-10 ENCOUNTER — HEALTH MAINTENANCE LETTER (OUTPATIENT)
Age: 64
End: 2024-03-10

## 2025-03-16 ENCOUNTER — HEALTH MAINTENANCE LETTER (OUTPATIENT)
Age: 65
End: 2025-03-16